# Patient Record
Sex: MALE | URBAN - METROPOLITAN AREA
[De-identification: names, ages, dates, MRNs, and addresses within clinical notes are randomized per-mention and may not be internally consistent; named-entity substitution may affect disease eponyms.]

---

## 2024-03-13 ENCOUNTER — TELEPHONE (OUTPATIENT)
Dept: CARDIAC REHAB | Age: 69
End: 2024-03-13

## 2024-03-13 NOTE — TELEPHONE ENCOUNTER
Attempted to reach pt regarding referral for Cardiac Rehab. No answer. Left m for pt to return call.

## 2024-03-21 ENCOUNTER — TELEPHONE (OUTPATIENT)
Dept: CARDIAC REHAB | Age: 69
End: 2024-03-21

## 2024-03-26 ASSESSMENT — EJECTION FRACTION: EF_VALUE: 55

## 2024-03-29 ENCOUNTER — HOSPITAL ENCOUNTER (OUTPATIENT)
Dept: CARDIAC REHAB | Age: 69
Setting detail: THERAPIES SERIES
Discharge: HOME | End: 2024-03-29
Payer: COMMERCIAL

## 2024-03-29 VITALS — RESPIRATION RATE: 18 BRPM | WEIGHT: 187.2 LBS | BODY MASS INDEX: 24.02 KG/M2 | OXYGEN SATURATION: 98 % | HEIGHT: 74 IN

## 2024-03-29 PROCEDURE — 93798 PHYS/QHP OP CAR RHAB W/ECG: CPT

## 2024-03-29 RX ORDER — ATORVASTATIN CALCIUM 80 MG/1
80 TABLET, FILM COATED ORAL DAILY
COMMUNITY
Start: 2024-02-16

## 2024-03-29 RX ORDER — LOSARTAN POTASSIUM 25 MG/1
25 TABLET ORAL DAILY
COMMUNITY
Start: 2024-03-14 | End: 2024-04-13

## 2024-03-29 RX ORDER — FUROSEMIDE 20 MG/1
20 TABLET ORAL DAILY
COMMUNITY
Start: 2024-03-28 | End: 2024-04-02

## 2024-03-29 RX ORDER — KETOCONAZOLE 20 MG/G
CREAM TOPICAL
COMMUNITY
Start: 2023-11-29

## 2024-03-29 RX ORDER — NITROGLYCERIN 0.1MG/HR
1 PATCH, TRANSDERMAL 24 HOURS TRANSDERMAL DAILY
COMMUNITY
Start: 2024-03-14

## 2024-03-29 ASSESSMENT — PATIENT HEALTH QUESTIONNAIRE - PHQ9
4. FEELING TIRED OR HAVING LITTLE ENERGY: NOT AT ALL
5. POOR APPETITE OR OVEREATING: NOT AT ALL
3. TROUBLE FALLING OR STAYING ASLEEP: NOT AT ALL
SUM OF ALL RESPONSES TO PHQ QUESTIONS 1-9: 0
1. LITTLE INTEREST OR PLEASURE IN DOING THINGS: NOT AT ALL
2. FEELING DOWN, DEPRESSED OR HOPELESS: NOT AT ALL
SUM OF ALL RESPONSES TO PHQ QUESTIONS 1-9: 0
SUM OF ALL RESPONSES TO PHQ QUESTIONS 1-9: 0
7. TROUBLE CONCENTRATING ON THINGS, SUCH AS READING THE NEWSPAPER OR WATCHING TELEVISION: NOT AT ALL
6. FEELING BAD ABOUT YOURSELF - OR THAT YOU ARE A FAILURE OR HAVE LET YOURSELF OR YOUR FAMILY DOWN: NOT AT ALL
9. THOUGHTS THAT YOU WOULD BE BETTER OFF DEAD, OR OF HURTING YOURSELF: NOT AT ALL
SUM OF ALL RESPONSES TO PHQ QUESTIONS 1-9: 0
8. MOVING OR SPEAKING SO SLOWLY THAT OTHER PEOPLE COULD HAVE NOTICED. OR THE OPPOSITE, BEING SO FIGETY OR RESTLESS THAT YOU HAVE BEEN MOVING AROUND A LOT MORE THAN USUAL: NOT AT ALL
SUM OF ALL RESPONSES TO PHQ9 QUESTIONS 1 & 2: 0
10. IF YOU CHECKED OFF ANY PROBLEMS, HOW DIFFICULT HAVE THESE PROBLEMS MADE IT FOR YOU TO DO YOUR WORK, TAKE CARE OF THINGS AT HOME, OR GET ALONG WITH OTHER PEOPLE: NOT DIFFICULT AT ALL

## 2024-03-29 ASSESSMENT — LIFESTYLE VARIABLES: ALCOHOL_USE: DAILY

## 2024-03-29 NOTE — PLAN OF CARE
Cardiopulmonary Rehab Treatment Plan   Name: Andrew Berg Assessment Date: 3/29/2024   : 1955 Primary Diagnosis: Treatment Diagnosis 1: CABG      Age: 69 y.o. Referring Physician:  Terry Dey   MRN: [id number] Primary Care Physician: Marky Pastrana MD   Treatment Diagnosis  Treatment Diagnosis 1: CABG  CABG Date: 24  Referral Date: 24    Individual Treatment Plan  ITP Visit Type: Initial assessment  1st Date of Exercise : 24  ITP Next Review Date: 24  Visit #/Total Visits:   EF%: 55 %  ITP: Exercise; Psychosocial; Nutrition; Education    Exercise   Stages of Change: Preparation  White Total Score: 25  Test: Six minute walk test    Data Measured Before Walk  Heart Rate: 59  Blood Pressure: 134/64  O2 Saturation: 98  O2 Device: Room air    Data Measured during Walk  Indicate Mode of RPE: 6 minutes/submax  RPE Data Measured: Post    Data Measured Immediately After Walk  Distance Walked in : ft  Distance Walked (ft): 1150 ft  Modified Peggy Scale: 0    Data Measured at 5 Minutes After Walk  O2 Device: Room air    Exercise Prescription  Mode: Track; Treadmill; Bike; Stepper; Ergometer; Elliptical; Rower  Frequency per week: 2-3 supervised sessions weekly  Duration Per Session: 20-36+ minutes of steady aerobic exercise  Intensity METS      : 3-6 (Increase workloads 0.5-1.0 METS/weekly)  RPE: 11-14  Progression: Start w/1-2 sets of 8-12 repetitions for ea. muscle group. Use1-5# initially (very light resistance).  Resistance Training: Yes    Exercise Blood Pressures  Resting BP: 134/64    Exercise Activity at Home  Type: walking    Exercise Education  Education: Exercise safety; Signs/symptoms to report; RPE scale    Psychosocial  Stages of Change: Preparation  [facility] Total Score: 21  PHQ-9 Total Score: 0    Psychosocial Intervention  Currently Taking Psychotropic Meds: No  Medication Changes: Yes    Psychosocial Education  Education: Benefits of CPR completion; Cardiac

## 2024-04-01 ENCOUNTER — HOSPITAL ENCOUNTER (OUTPATIENT)
Dept: CARDIAC REHAB | Age: 69
Setting detail: THERAPIES SERIES
Discharge: HOME | End: 2024-04-01
Payer: COMMERCIAL

## 2024-04-01 VITALS — WEIGHT: 187.2 LBS | HEIGHT: 74 IN | BODY MASS INDEX: 24.02 KG/M2

## 2024-04-01 PROCEDURE — 93798 PHYS/QHP OP CAR RHAB W/ECG: CPT

## 2024-04-03 ENCOUNTER — HOSPITAL ENCOUNTER (OUTPATIENT)
Dept: CARDIAC REHAB | Age: 69
Setting detail: THERAPIES SERIES
Discharge: HOME | End: 2024-04-03
Payer: COMMERCIAL

## 2024-04-03 PROCEDURE — 93798 PHYS/QHP OP CAR RHAB W/ECG: CPT

## 2024-04-05 ENCOUNTER — HOSPITAL ENCOUNTER (OUTPATIENT)
Dept: CARDIAC REHAB | Age: 69
Setting detail: THERAPIES SERIES
Discharge: HOME | End: 2024-04-05
Payer: COMMERCIAL

## 2024-04-05 VITALS — WEIGHT: 187.3 LBS | BODY MASS INDEX: 24.05 KG/M2

## 2024-04-05 PROCEDURE — 93798 PHYS/QHP OP CAR RHAB W/ECG: CPT

## 2024-04-08 ENCOUNTER — HOSPITAL ENCOUNTER (OUTPATIENT)
Dept: CARDIAC REHAB | Age: 69
Setting detail: THERAPIES SERIES
Discharge: HOME | End: 2024-04-08
Payer: COMMERCIAL

## 2024-04-08 VITALS — WEIGHT: 189.7 LBS | BODY MASS INDEX: 24.36 KG/M2

## 2024-04-08 PROCEDURE — 93798 PHYS/QHP OP CAR RHAB W/ECG: CPT

## 2024-04-10 ENCOUNTER — HOSPITAL ENCOUNTER (OUTPATIENT)
Dept: CARDIAC REHAB | Age: 69
Setting detail: THERAPIES SERIES
Discharge: HOME | End: 2024-04-10
Payer: COMMERCIAL

## 2024-04-10 VITALS — WEIGHT: 189.2 LBS | BODY MASS INDEX: 24.29 KG/M2

## 2024-04-10 PROCEDURE — 93798 PHYS/QHP OP CAR RHAB W/ECG: CPT

## 2024-04-12 ENCOUNTER — HOSPITAL ENCOUNTER (OUTPATIENT)
Dept: CARDIAC REHAB | Age: 69
Setting detail: THERAPIES SERIES
Discharge: HOME | End: 2024-04-12
Payer: COMMERCIAL

## 2024-04-12 VITALS — BODY MASS INDEX: 24.34 KG/M2 | WEIGHT: 189.6 LBS

## 2024-04-12 PROCEDURE — 93798 PHYS/QHP OP CAR RHAB W/ECG: CPT

## 2024-04-15 ENCOUNTER — HOSPITAL ENCOUNTER (OUTPATIENT)
Dept: CARDIAC REHAB | Age: 69
Setting detail: THERAPIES SERIES
Discharge: HOME | End: 2024-04-15
Payer: COMMERCIAL

## 2024-04-15 VITALS — BODY MASS INDEX: 24.34 KG/M2 | WEIGHT: 189.6 LBS

## 2024-04-15 PROCEDURE — 93798 PHYS/QHP OP CAR RHAB W/ECG: CPT

## 2024-04-17 ENCOUNTER — HOSPITAL ENCOUNTER (OUTPATIENT)
Dept: CARDIAC REHAB | Age: 69
Setting detail: THERAPIES SERIES
Discharge: HOME | End: 2024-04-17
Payer: COMMERCIAL

## 2024-04-17 PROCEDURE — 93798 PHYS/QHP OP CAR RHAB W/ECG: CPT

## 2024-04-17 ASSESSMENT — EJECTION FRACTION: EF_VALUE: 55

## 2024-04-19 ENCOUNTER — HOSPITAL ENCOUNTER (OUTPATIENT)
Dept: CARDIAC REHAB | Age: 69
Setting detail: THERAPIES SERIES
Discharge: HOME | End: 2024-04-19
Payer: COMMERCIAL

## 2024-04-19 PROCEDURE — 93798 PHYS/QHP OP CAR RHAB W/ECG: CPT

## 2024-04-22 ENCOUNTER — HOSPITAL ENCOUNTER (OUTPATIENT)
Dept: CARDIAC REHAB | Age: 69
Setting detail: THERAPIES SERIES
Discharge: HOME | End: 2024-04-22
Payer: COMMERCIAL

## 2024-04-22 VITALS — BODY MASS INDEX: 24.48 KG/M2 | WEIGHT: 190.7 LBS

## 2024-04-22 PROCEDURE — 93798 PHYS/QHP OP CAR RHAB W/ECG: CPT

## 2024-04-24 ENCOUNTER — HOSPITAL ENCOUNTER (OUTPATIENT)
Dept: CARDIAC REHAB | Age: 69
Setting detail: THERAPIES SERIES
Discharge: HOME | End: 2024-04-24
Payer: COMMERCIAL

## 2024-04-24 VITALS — BODY MASS INDEX: 24.52 KG/M2 | WEIGHT: 191 LBS

## 2024-04-24 PROCEDURE — 93798 PHYS/QHP OP CAR RHAB W/ECG: CPT

## 2024-04-24 ASSESSMENT — PATIENT HEALTH QUESTIONNAIRE - PHQ9
10. IF YOU CHECKED OFF ANY PROBLEMS, HOW DIFFICULT HAVE THESE PROBLEMS MADE IT FOR YOU TO DO YOUR WORK, TAKE CARE OF THINGS AT HOME, OR GET ALONG WITH OTHER PEOPLE: NOT DIFFICULT AT ALL
SUM OF ALL RESPONSES TO PHQ QUESTIONS 1-9: 0
4. FEELING TIRED OR HAVING LITTLE ENERGY: NOT AT ALL
2. FEELING DOWN, DEPRESSED OR HOPELESS: NOT AT ALL
9. THOUGHTS THAT YOU WOULD BE BETTER OFF DEAD, OR OF HURTING YOURSELF: NOT AT ALL
8. MOVING OR SPEAKING SO SLOWLY THAT OTHER PEOPLE COULD HAVE NOTICED. OR THE OPPOSITE, BEING SO FIGETY OR RESTLESS THAT YOU HAVE BEEN MOVING AROUND A LOT MORE THAN USUAL: NOT AT ALL
SUM OF ALL RESPONSES TO PHQ QUESTIONS 1-9: 0
SUM OF ALL RESPONSES TO PHQ QUESTIONS 1-9: 0
5. POOR APPETITE OR OVEREATING: NOT AT ALL
6. FEELING BAD ABOUT YOURSELF - OR THAT YOU ARE A FAILURE OR HAVE LET YOURSELF OR YOUR FAMILY DOWN: NOT AT ALL
SUM OF ALL RESPONSES TO PHQ9 QUESTIONS 1 & 2: 0
1. LITTLE INTEREST OR PLEASURE IN DOING THINGS: NOT AT ALL
SUM OF ALL RESPONSES TO PHQ QUESTIONS 1-9: 0
3. TROUBLE FALLING OR STAYING ASLEEP: NOT AT ALL
7. TROUBLE CONCENTRATING ON THINGS, SUCH AS READING THE NEWSPAPER OR WATCHING TELEVISION: NOT AT ALL

## 2024-04-26 ENCOUNTER — HOSPITAL ENCOUNTER (OUTPATIENT)
Dept: CARDIAC REHAB | Age: 69
Setting detail: THERAPIES SERIES
Discharge: HOME | End: 2024-04-26
Payer: COMMERCIAL

## 2024-04-26 VITALS — BODY MASS INDEX: 24.36 KG/M2 | WEIGHT: 189.7 LBS

## 2024-04-26 PROCEDURE — 93798 PHYS/QHP OP CAR RHAB W/ECG: CPT

## 2024-04-26 ASSESSMENT — EXERCISE STRESS TEST
PEAK_BP: 142/62
PEAK_HR: 64
PEAK_BP: 142/62

## 2024-04-26 NOTE — PROGRESS NOTES
Spoke to MELVI Glover ,orders received to send patient home. Patient instructed of signs and symptoms to be aware of. Patient discharged in stable condition home. Patient verbalized understanding.

## 2024-04-26 NOTE — PLAN OF CARE
Cardiopulmonary Rehab Treatment Plan   Name: Andrew Berg Assessment Date: 2024   : 1955 Primary Diagnosis: Treatment Diagnosis 1: CABG      Age: 69 y.o. Referring Physician:  Terry Dey   MRN: [id number] Primary Care Physician: Marky Pastrana MD   Treatment Diagnosis  Treatment Diagnosis 1: CABG  CABG Date: 24  Referral Date: 24    Individual Treatment Plan  ITP Visit Type: Discharge, completed program  1st Date of Exercise : 24  ITP Next Review Date:  (sent 24)  Visit #/Total Visits:   EF%: 55 %  ITP: Exercise; Psychosocial; Nutrition; Education    Exercise   Stages of Change: Preparation  White Total Score: 25  Test: Six minute walk test    Data Measured Before Walk  Heart Rate: 55  Blood Pressure: 118/80  O2 Saturation: 98  O2 Device: Room air  RPD: 0  RPE: 0    Data Measured during Walk  Indicate Mode of RPE: 6 minutes/submax  RPE Data Measured: Post    Data Measured Immediately After Walk  Distance Walked in : ft  Distance Walked (ft): 1265 ft  Peak Heart Rate: 64  Peak Blood Pressure: 142/62  Modified Peggy Scale: 1  RPE: 12  O2 Saturation: 96    Data Measured at 5 Minutes After Walk  Heart Rate: 64 (30's-64. See notes for call to MD. pt asymptomatic.)  Blood Pressure: 136/62  SpO2: 96 %  O2 Device: Room air    Exercise Prescription  Mode: Track; Treadmill; Bike; Stepper; Ergometer; Elliptical; Rower  Frequency per week: 2-3 supervised sessions weekly  Duration Per Session: 20-36+ minutes of steady aerobic exercise  Intensity METS      : 3-6 (Increase workloads 0.5-1.0 METS/weekly)  RPE: 11-14  Progression: Start w/1-2 sets of 8-12 repetitions for ea. muscle group. Use1-5# initially (very light resistance).  Resistance Training: Yes    Exercise Blood Pressures  Resting BP: 118/80  Peak BP: 142/62  Is BP WDL: Yes    Exercise Activity at Home  Type: pt states he has been walking at home  Frequency: daily  Duration: 15-20 min day  Resistance Training:

## 2024-04-26 NOTE — PROGRESS NOTES
Patient had CABG 3/8/24. Post walk test HR low 30's. Patient asymptomatic. Patient is suppose to return to work 4/29/24 full duty. Patient returns to baseline when activity stops. BP, WDL, skin warm and dry. Patient takes metoprolol 25 mg. Awaiting call  from cardiologist. Strips faxed to cardiologist.

## 2024-04-29 ENCOUNTER — APPOINTMENT (OUTPATIENT)
Dept: CARDIAC REHAB | Age: 69
End: 2024-04-29
Payer: COMMERCIAL